# Patient Record
Sex: MALE | Race: BLACK OR AFRICAN AMERICAN | NOT HISPANIC OR LATINO
[De-identification: names, ages, dates, MRNs, and addresses within clinical notes are randomized per-mention and may not be internally consistent; named-entity substitution may affect disease eponyms.]

---

## 2022-07-17 ENCOUNTER — APPOINTMENT (OUTPATIENT)
Dept: ORTHOPEDIC SURGERY | Facility: CLINIC | Age: 62
End: 2022-07-17

## 2022-07-17 VITALS — WEIGHT: 246 LBS | HEIGHT: 69 IN | BODY MASS INDEX: 36.43 KG/M2

## 2022-07-17 DIAGNOSIS — S46.011A STRAIN OF MUSCLE(S) AND TENDON(S) OF THE ROTATOR CUFF OF RIGHT SHOULDER, INITIAL ENCOUNTER: ICD-10-CM

## 2022-07-17 PROBLEM — Z00.00 ENCOUNTER FOR PREVENTIVE HEALTH EXAMINATION: Status: ACTIVE | Noted: 2022-07-17

## 2022-07-17 PROCEDURE — 73030 X-RAY EXAM OF SHOULDER: CPT | Mod: RT

## 2022-07-17 PROCEDURE — J3490M: CUSTOM | Mod: RT

## 2022-07-17 PROCEDURE — 20611 DRAIN/INJ JOINT/BURSA W/US: CPT | Mod: RT

## 2022-07-17 PROCEDURE — 99214 OFFICE O/P EST MOD 30 MIN: CPT | Mod: 25

## 2022-07-17 NOTE — ASSESSMENT
[FreeTextEntry1] : right shoulder pain with lifting for several weeks.  mild oa.  possible cuff tear.\par \par diabetes. well controlled per patient\par nsaids prn

## 2022-07-17 NOTE — PROCEDURE
[Large Joint Injection] : Large joint injection [Right] : of the right [Subacromial Space] : subacromial space [Pain] : pain [Alcohol] : alcohol [Betadine] : betadine [___ cc    3mg] :  Betamethasone (Celestone) ~Vcc of 3mg [___ cc    1%] : Lidocaine ~Vcc of 1%  [___ cc    0.25%] : Bupivacaine (Marcaine) ~Vcc of 0.25%  [] : Patient tolerated procedure well [Call if redness, pain or fever occur] : call if redness, pain or fever occur [Apply ice for 15min out of every hour for the next 12-24 hours as tolerated] : apply ice for 15 minutes out of every hour for the next 12-24 hours as tolerated [Patient was advised to rest the joint(s) for ____ days] : patient was advised to rest the joint(s) for [unfilled] days [Previous OTC use and PT nontherapeutic] : patient has tried OTC's including aspirin, Ibuprofen, Aleve, etc or prescription NSAIDS, and/or exercises at home and/or physical therapy without satisfactory response [Patient had decreased mobility in the joint] : patient had decreased mobility in the joint [Risks, benefits, alternatives discussed / Verbal consent obtained] : the risks benefits, and alternatives have been discussed, and verbal consent was obtained [Prior failure or difficult injection] : prior failure or difficult injection [All ultrasound images have been permanently captured and stored accordingly in our picture archiving and communication system] : All ultrasound images have been permanently captured and stored accordingly in our picture archiving and communication system [Visualization of the needle and placement of injection was performed without complication] : visualization of the needle and placement of injection was performed without complication [Inflammation] : inflammation

## 2022-07-17 NOTE — HISTORY OF PRESENT ILLNESS
[8] : 8 [Sharp] : sharp [Bending forward] : bending forward [de-identified] : 7/17/22:  right shoulder pain for a few weeks. [FreeTextEntry5] : pt c.o pain rt shoulder for 2 weeks, \par pt states NKI

## 2022-07-17 NOTE — PHYSICAL EXAM
[] : tenderness at lateral shoulder [Right] : right shoulder [Degenerative change] : Degenerative change [TWNoteComboBox7] : active forward flexion 160 degrees [TWNoteComboBox6] : internal rotation L4 [de-identified] : external rotation 35 degrees

## 2022-08-05 ENCOUNTER — FORM ENCOUNTER (OUTPATIENT)
Age: 62
End: 2022-08-05

## 2022-08-06 ENCOUNTER — APPOINTMENT (OUTPATIENT)
Dept: MRI IMAGING | Facility: CLINIC | Age: 62
End: 2022-08-06

## 2022-08-06 PROCEDURE — 73221 MRI JOINT UPR EXTREM W/O DYE: CPT | Mod: RT

## 2022-08-16 ENCOUNTER — APPOINTMENT (OUTPATIENT)
Dept: ORTHOPEDIC SURGERY | Facility: CLINIC | Age: 62
End: 2022-08-16

## 2022-08-16 VITALS — BODY MASS INDEX: 36.43 KG/M2 | WEIGHT: 246 LBS | HEIGHT: 69 IN

## 2022-08-16 PROCEDURE — 99214 OFFICE O/P EST MOD 30 MIN: CPT

## 2022-08-16 NOTE — ASSESSMENT
[FreeTextEntry1] : right shoulder pain with lifting for several weeks.  mild oa.  mri 2022 with full thickness, retracted cuff tear.  csi on 7/17/22. \par \par diabetes. well controlled per patient\par nsaids prn

## 2022-08-16 NOTE — HISTORY OF PRESENT ILLNESS
[6] : 6 [5] : 5 [Dull/Aching] : dull/aching [Localized] : localized [Throbbing] : throbbing [de-identified] : 7/17/22:  right shoulder pain for a few weeks.\par 8/16/22:  csi last visit.  right shoulder pain.   [FreeTextEntry1] : right shoulder  [de-identified] : physical therapy

## 2022-08-16 NOTE — DATA REVIEWED
[MRI] : MRI [Right] : of the right [Shoulder] : shoulder [Report was reviewed and noted in the chart] : The report was reviewed and noted in the chart [I reviewed the films/CD] : I reviewed the films/CD

## 2022-08-16 NOTE — PHYSICAL EXAM
[] : tenderness at lateral shoulder [Right] : right shoulder [Degenerative change] : Degenerative change [TWNoteComboBox7] : active forward flexion 160 degrees [TWNoteComboBox6] : internal rotation L4 [de-identified] : external rotation 35 degrees

## 2022-08-16 NOTE — DISCUSSION/SUMMARY
[de-identified] : Instructions:  Progress Note completed by Kathrin Vu PA-C\par * Dr. Arvizu -- The documentation recorded accurately reflects the decisions made by me during this visit.

## 2022-09-27 ENCOUNTER — APPOINTMENT (OUTPATIENT)
Dept: ORTHOPEDIC SURGERY | Facility: CLINIC | Age: 62
End: 2022-09-27

## 2022-09-27 VITALS — BODY MASS INDEX: 36.43 KG/M2 | WEIGHT: 246 LBS | HEIGHT: 69 IN

## 2022-09-27 PROCEDURE — 99213 OFFICE O/P EST LOW 20 MIN: CPT

## 2022-09-27 NOTE — HISTORY OF PRESENT ILLNESS
[5] : 5 [1] : 2 [Dull/Aching] : dull/aching [de-identified] : 7/17/22:  right shoulder pain for a few weeks.\par 8/16/22:  csi last visit.  right shoulder pain.  \par 9/27/22:  right shoulder improved but still a decent amount of pain. [] : Post Surgical Visit: no [FreeTextEntry1] : right shoulder [de-identified] : physical therapy

## 2022-09-27 NOTE — PHYSICAL EXAM
[Right] : right shoulder [Degenerative change] : Degenerative change [] : tenderness at anterior shoulder [4 ___] : forward flexion 4[unfilled]/5 [4___] : abduction 4[unfilled]/5 [5___] : internal rotation 5[unfilled]/5 [TWNoteComboBox7] : active forward flexion 170 degrees [TWNoteComboBox6] : internal rotation L4 [de-identified] : external rotation 35 degrees

## 2022-09-27 NOTE — ASSESSMENT
[FreeTextEntry1] : right shoulder pain with lifting for several weeks.  mild oa.  mri 2022 with full thickness, retracted cuff tear.  csi on 7/17/22. improved with PT but still has some pain.\par \par diabetes. well controlled per patient\par nsaids prn

## 2022-09-27 NOTE — DISCUSSION/SUMMARY
[de-identified] : Instructions:  Progress Note completed by Kathrin Vu PA-C\par * Dr. Arvizu -- The documentation recorded accurately reflects the decisions made by me during this visit.

## 2022-11-01 ENCOUNTER — APPOINTMENT (OUTPATIENT)
Dept: ORTHOPEDIC SURGERY | Facility: CLINIC | Age: 62
End: 2022-11-01
Payer: COMMERCIAL

## 2022-11-01 VITALS — HEIGHT: 69 IN | BODY MASS INDEX: 36.43 KG/M2 | WEIGHT: 246 LBS

## 2022-11-01 PROCEDURE — 99213 OFFICE O/P EST LOW 20 MIN: CPT

## 2022-11-01 NOTE — DISCUSSION/SUMMARY
[de-identified] : Instructions:  Progress Note completed by Kathrin Vu PA-C\par * Dr. Arvizu -- The documentation recorded accurately reflects the decisions made by me during this visit.

## 2022-11-01 NOTE — HISTORY OF PRESENT ILLNESS
[2] : 2 [0] : 0 [Dull/Aching] : dull/aching [Localized] : localized [Rest] : rest [de-identified] : 7/17/22:  right shoulder pain for a few weeks.\par 8/16/22:  csi last visit.  right shoulder pain.  \par 9/27/22:  right shoulder improved but still a decent amount of pain.\par 11/1/22:  right shoulder improving with pt.  little discomfort.  [] : Post Surgical Visit: no [FreeTextEntry1] : right shoulder  [de-identified] : activity [de-identified] : a

## 2022-11-01 NOTE — ASSESSMENT
[FreeTextEntry1] : right shoulder pain with lifting for several weeks.  mild oa.  mri 2022 with full thickness, retracted cuff tear.  csi on 7/17/22.  improving with pt.  defers surgery at this time - understands risks. \par \par diabetes. well controlled per patient\par nsaids prn

## 2022-11-01 NOTE — PHYSICAL EXAM
[Right] : right shoulder [4 ___] : forward flexion 4[unfilled]/5 [4___] : abduction 4[unfilled]/5 [5___] : internal rotation 5[unfilled]/5 [] : no tenderness to palpation [TWNoteComboBox7] : active forward flexion 170 degrees [TWNoteComboBox6] : internal rotation L4 [de-identified] : active abduction 160 degrees [de-identified] : external rotation 70 degrees

## 2022-12-20 ENCOUNTER — APPOINTMENT (OUTPATIENT)
Dept: ORTHOPEDIC SURGERY | Facility: CLINIC | Age: 62
End: 2022-12-20

## 2023-08-13 ENCOUNTER — NON-APPOINTMENT (OUTPATIENT)
Age: 63
End: 2023-08-13

## 2024-02-07 ENCOUNTER — NON-APPOINTMENT (OUTPATIENT)
Age: 64
End: 2024-02-07

## 2024-02-16 ENCOUNTER — APPOINTMENT (OUTPATIENT)
Dept: ORTHOPEDIC SURGERY | Facility: CLINIC | Age: 64
End: 2024-02-16
Payer: COMMERCIAL

## 2024-02-16 PROCEDURE — 73030 X-RAY EXAM OF SHOULDER: CPT | Mod: RT

## 2024-02-16 PROCEDURE — 99213 OFFICE O/P EST LOW 20 MIN: CPT

## 2024-02-16 PROCEDURE — 73630 X-RAY EXAM OF FOOT: CPT | Mod: LT

## 2024-02-16 NOTE — DISCUSSION/SUMMARY
[de-identified] : mri right shoulder to assess. PT left foot. foot and ankle consult. follow up Nolberto after shoulder mri.

## 2024-02-16 NOTE — HISTORY OF PRESENT ILLNESS
[Dull/Aching] : dull/aching [Frequent] : frequent [Sleep] : sleep [Rest] : rest [de-identified] : 7/17/22:  right shoulder pain for a few weeks. 8/16/22:  csi last visit.  right shoulder pain.   9/27/22:  right shoulder improved but still a decent amount of pain. 11/1/22:  right shoulder improving with pt.  little discomfort.  2/16/24:  right shoulder pain flaring up.  also now left foot pain since december 2023.  no injury.  [] : no [FreeTextEntry1] : right shoulder  [FreeTextEntry5] : TATY is here for right shoulder pain. states he was treated last 11/2022. pt states pain prevents him from working ([employer]). limited ROM, pain worse at night.  he is also c/o L foot pain, worse with WB after resting.  [de-identified] : movement [de-identified] : 11/2022 [de-identified] : [facility]

## 2024-02-16 NOTE — PHYSICAL EXAM
[4 ___] : forward flexion 4[unfilled]/5 [5___] : eversion 5[unfilled]/5 [2+] : posterior tibialis pulse: 2+ [4___] : internal rotation 4[unfilled]/5 [Right] : right shoulder [TWNoteComboBox7] : active forward flexion 90 degrees [de-identified] : active abduction 90 degrees [TWNoteComboBox6] : internal rotation L4 [de-identified] : external rotation 40 degrees [] : no swelling [NL (40)] : plantar flexion 40 degrees [NL 30)] : inversion 30 degrees [NL (20)] : eversion 20 degrees [Left] : left foot [There are no fractures, subluxations or dislocations. No significant abnormalities are seen] : There are no fractures, subluxations or dislocations. No significant abnormalities are seen [FreeTextEntry8] : ttp plantar heel

## 2024-02-16 NOTE — ASSESSMENT
[FreeTextEntry1] : right shoulder pain with lifting for several weeks.  mild oa.  mri 2022 with full thickness, retracted cuff tear.  csi on 7/17/22.  recurrent pain.  patient was told by job ([employer]) that he needs to stop working and get shoulder fixed.  will get mri to assess condition of cuff.  left foot pain since december 2023.  no injury.    with plantar fasciitis    diabetes. well controlled per patient nsaids prn

## 2024-02-25 ENCOUNTER — APPOINTMENT (OUTPATIENT)
Dept: MRI IMAGING | Facility: CLINIC | Age: 64
End: 2024-02-25
Payer: COMMERCIAL

## 2024-02-25 PROCEDURE — 73221 MRI JOINT UPR EXTREM W/O DYE: CPT | Mod: RT

## 2024-03-05 ENCOUNTER — APPOINTMENT (OUTPATIENT)
Dept: ORTHOPEDIC SURGERY | Facility: CLINIC | Age: 64
End: 2024-03-05
Payer: COMMERCIAL

## 2024-03-05 DIAGNOSIS — M72.2 PLANTAR FASCIAL FIBROMATOSIS: ICD-10-CM

## 2024-03-05 PROCEDURE — 99214 OFFICE O/P EST MOD 30 MIN: CPT

## 2024-03-05 NOTE — ASSESSMENT
[FreeTextEntry1] : right shoulder pain with lifting for several weeks.  mild oa.  mri 2022 with full thickness, retracted cuff tear.  csi on 7/17/22.  recurrent pain.  patient was told by job ([employer]) that he needs to stop working and get shoulder fixed.  mri shows large tear but looks fixable.  defers surgery again.  left foot pain since december 2023.  no injury.    with plantar fasciitis  diabetes. well controlled per patient nsaids prn

## 2024-03-05 NOTE — PHYSICAL EXAM
[4 ___] : forward flexion 4[unfilled]/5 [4___] : internal rotation 4[unfilled]/5 [Right] : right shoulder [NL (40)] : plantar flexion 40 degrees [NL (20)] : eversion 20 degrees [NL 30)] : inversion 30 degrees [5___] : eversion 5[unfilled]/5 [2+] : posterior tibialis pulse: 2+ [Left] : left foot [There are no fractures, subluxations or dislocations. No significant abnormalities are seen] : There are no fractures, subluxations or dislocations. No significant abnormalities are seen [TWNoteComboBox7] : active forward flexion 90 degrees [de-identified] : active abduction 90 degrees [TWNoteComboBox6] : internal rotation L4 [de-identified] : external rotation 40 degrees [] : no abrasion [FreeTextEntry8] : ttp plantar heel

## 2024-03-05 NOTE — HISTORY OF PRESENT ILLNESS
[Gradual] : gradual [7] : 7 [6] : 6 [Dull/Aching] : dull/aching [Frequent] : frequent [Sleep] : sleep [Rest] : rest [de-identified] : 7/17/22:  right shoulder pain for a few weeks. 8/16/22:  csi last visit.  right shoulder pain.   9/27/22:  right shoulder improved but still a decent amount of pain. 11/1/22:  right shoulder improving with pt.  little discomfort.  2/16/24:  right shoulder pain flaring up.  also now left foot pain since december 2023.  no injury.  3/5/24:  right shoulder pain persists.   back to review mri right shoulder.   [] : no [de-identified] : movement [FreeTextEntry1] : right shoulder  [de-identified] : 11/2022 [de-identified] : [facility]

## 2024-03-27 ENCOUNTER — APPOINTMENT (OUTPATIENT)
Dept: ORTHOPEDIC SURGERY | Facility: CLINIC | Age: 64
End: 2024-03-27

## 2024-04-10 ENCOUNTER — APPOINTMENT (OUTPATIENT)
Dept: ORTHOPEDIC SURGERY | Facility: CLINIC | Age: 64
End: 2024-04-10

## 2024-04-23 ENCOUNTER — APPOINTMENT (OUTPATIENT)
Dept: ORTHOPEDIC SURGERY | Facility: CLINIC | Age: 64
End: 2024-04-23
Payer: COMMERCIAL

## 2024-04-23 ENCOUNTER — NON-APPOINTMENT (OUTPATIENT)
Age: 64
End: 2024-04-23

## 2024-04-23 VITALS — HEIGHT: 69 IN | BODY MASS INDEX: 36.43 KG/M2 | WEIGHT: 246 LBS

## 2024-04-23 DIAGNOSIS — S46.911D STRAIN OF UNSPECIFIED MUSCLE, FASCIA AND TENDON AT SHOULDER AND UPPER ARM LEVEL, RIGHT ARM, SUBSEQUENT ENCOUNTER: ICD-10-CM

## 2024-04-23 DIAGNOSIS — M19.011 PRIMARY OSTEOARTHRITIS, RIGHT SHOULDER: ICD-10-CM

## 2024-04-23 PROCEDURE — 99213 OFFICE O/P EST LOW 20 MIN: CPT

## 2024-04-23 NOTE — PHYSICAL EXAM
[4 ___] : forward flexion 4[unfilled]/5 [4___] : internal rotation 4[unfilled]/5 [Right] : right shoulder [Left] : left foot [There are no fractures, subluxations or dislocations. No significant abnormalities are seen] : There are no fractures, subluxations or dislocations. No significant abnormalities are seen [] : positive impingement testing [TWNoteComboBox7] : active forward flexion 100 degrees [de-identified] : active abduction 90 degrees [TWNoteComboBox6] : internal rotation L4 [de-identified] : external rotation 40 degrees [FreeTextEntry8] : ttp plantar heel

## 2024-04-23 NOTE — HISTORY OF PRESENT ILLNESS
[5] : 5 [4] : 4 [Dull/Aching] : dull/aching [Sharp] : sharp [de-identified] : 7/17/22:  right shoulder pain for a few weeks. 8/16/22:  csi last visit.  right shoulder pain.   9/27/22:  right shoulder improved but still a decent amount of pain. 11/1/22:  right shoulder improving with pt.  little discomfort.  2/16/24:  right shoulder pain flaring up.  also now left foot pain since december 2023.  no injury.  3/5/24:  right shoulder pain persists.   back to review mri right shoulder.  4/23/24: PT helping shoulder but still has pain.  [] : Post Surgical Visit: no [FreeTextEntry1] : right shoulder  [de-identified] : pt

## 2024-04-23 NOTE — ASSESSMENT
[FreeTextEntry1] : right shoulder pain.  mri 2022 with full thickness, retracted cuff tear.  csi on 7/17/22.    mri shows large tear but looks fixable.  defers surgery.  left foot pain since december 2023.  no injury.    with plantar fasciitis.  seeing podiatrist.  patient works at [employer].  cannot work now as he cannot do repetitive lifting or overhead lifting.   diabetes. well controlled per patient nsaids prn

## 2024-06-04 ENCOUNTER — APPOINTMENT (OUTPATIENT)
Dept: ORTHOPEDIC SURGERY | Facility: CLINIC | Age: 64
End: 2024-06-04
Payer: COMMERCIAL

## 2024-06-04 ENCOUNTER — NON-APPOINTMENT (OUTPATIENT)
Age: 64
End: 2024-06-04

## 2024-06-04 DIAGNOSIS — M75.121 COMPLETE ROTATOR CUFF TEAR OR RUPTURE OF RIGHT SHOULDER, NOT SPECIFIED AS TRAUMATIC: ICD-10-CM

## 2024-06-04 PROCEDURE — 99213 OFFICE O/P EST LOW 20 MIN: CPT

## 2024-06-04 NOTE — PHYSICAL EXAM
[Right] : right shoulder [4 ___] : forward flexion 4[unfilled]/5 [4___] : internal rotation 4[unfilled]/5 [] : tenderness at anterior shoulder [TWNoteComboBox7] : active forward flexion 110 degrees [de-identified] : active abduction 90 degrees [TWNoteComboBox6] : internal rotation L4 [de-identified] : external rotation 40 degrees [FreeTextEntry8] : ttp plantar heel

## 2024-06-04 NOTE — ASSESSMENT
[FreeTextEntry1] : right shoulder pain.  mri 2022 with full thickness, retracted cuff tear.  csi on 7/17/22.    mri shows large tear. defers surgery.  risks discussed.  left foot pain since december 2023.  no injury.    with plantar fasciitis.  seeing podiatrist.  patient works at [employer].  cannot work now as he cannot do repetitive lifting or overhead lifting.   diabetes. well controlled per patient nsaids prn

## 2024-06-04 NOTE — HISTORY OF PRESENT ILLNESS
[5] : 5 [4] : 4 [Dull/Aching] : dull/aching [Sharp] : sharp [de-identified] : 7/17/22:  right shoulder pain for a few weeks. 8/16/22:  csi last visit.  right shoulder pain.   9/27/22:  right shoulder improved but still a decent amount of pain. 11/1/22:  right shoulder improving with pt.  little discomfort.  2/16/24:  right shoulder pain flaring up.  also now left foot pain since december 2023.  no injury.  3/5/24:  right shoulder pain persists.   back to review mri right shoulder.  4/23/24: PT helping shoulder but still has pain.  6/4/24: shoulder still hurts.  no  PT since last visit. [] : Post Surgical Visit: no [FreeTextEntry1] : right shoulder  [de-identified] : pt

## 2024-07-16 ENCOUNTER — APPOINTMENT (OUTPATIENT)
Dept: ORTHOPEDIC SURGERY | Facility: CLINIC | Age: 64
End: 2024-07-16
Payer: COMMERCIAL

## 2024-07-16 DIAGNOSIS — M75.121 COMPLETE ROTATOR CUFF TEAR OR RUPTURE OF RIGHT SHOULDER, NOT SPECIFIED AS TRAUMATIC: ICD-10-CM

## 2024-07-16 PROCEDURE — 99213 OFFICE O/P EST LOW 20 MIN: CPT

## 2024-09-03 ENCOUNTER — NON-APPOINTMENT (OUTPATIENT)
Age: 64
End: 2024-09-03

## 2024-09-03 ENCOUNTER — APPOINTMENT (OUTPATIENT)
Dept: ORTHOPEDIC SURGERY | Facility: CLINIC | Age: 64
End: 2024-09-03
Payer: COMMERCIAL

## 2024-09-03 DIAGNOSIS — M19.011 PRIMARY OSTEOARTHRITIS, RIGHT SHOULDER: ICD-10-CM

## 2024-09-03 DIAGNOSIS — M75.121 COMPLETE ROTATOR CUFF TEAR OR RUPTURE OF RIGHT SHOULDER, NOT SPECIFIED AS TRAUMATIC: ICD-10-CM

## 2024-09-03 DIAGNOSIS — S46.911D STRAIN OF UNSPECIFIED MUSCLE, FASCIA AND TENDON AT SHOULDER AND UPPER ARM LEVEL, RIGHT ARM, SUBSEQUENT ENCOUNTER: ICD-10-CM

## 2024-09-03 PROCEDURE — 99214 OFFICE O/P EST MOD 30 MIN: CPT

## 2024-09-03 NOTE — HISTORY OF PRESENT ILLNESS
[5] : 5 [4] : 4 [Dull/Aching] : dull/aching [Sharp] : sharp [de-identified] : 7/17/22:  right shoulder pain for a few weeks. 8/16/22:  csi last visit.  right shoulder pain.   9/27/22:  right shoulder improved but still a decent amount of pain. 11/1/22:  right shoulder improving with pt.  little discomfort.  2/16/24:  right shoulder pain flaring up.  also now left foot pain since december 2023.  no injury.  3/5/24:  right shoulder pain persists.   back to review mri right shoulder.  4/23/24: PT helping shoulder but still has pain.  6/4/24: shoulder still hurts.  no  PT since last visit. 7/16/24:  follow up right shoulder.  mild pain still.   9/3/24:  follow up right shoulder.  improves with pt.  [] : Post Surgical Visit: no [FreeTextEntry1] : right shoulder  [de-identified] : pt

## 2024-09-03 NOTE — DISCUSSION/SUMMARY
[de-identified] : continue pt. still defers surgery at this time.  he understands risks. prefers otc meds prn.  The patient's orthopaedic condition(s) warrants consideration of consistent or intermittent use of a prescription strength non-steroidal anti-inflammatory medication.  These medications are associated with risks including but not limited to gastrointestinal irritation, kidney damage, hypertension, and bleeding.    Although clinically indicated, the patient defers taking such medications at this time. hep. follow up in 4-6 weeks.   Progress note completed by Kathrin Vu PA-C *Dr. Arvizu - The [facility] assigned on this date is under my supervision and saw this patient independently on this visit. I was in the office suite at the time.  I have periodically reviewed the patient chart as needed and I continue to oversee the medical decision making and care.

## 2024-09-03 NOTE — PHYSICAL EXAM
[Right] : right shoulder [4 ___] : forward flexion 4[unfilled]/5 [4___] : internal rotation 4[unfilled]/5 [] : no erythema [TWNoteComboBox7] : active forward flexion 140 degrees [de-identified] : active abduction 105 degrees [TWNoteComboBox6] : internal rotation L5 [de-identified] : external rotation 50 degrees [FreeTextEntry8] : ttp plantar heel

## 2024-09-03 NOTE — ASSESSMENT
[FreeTextEntry1] : right shoulder pain.  mri 2022 with full thickness, retracted cuff tear.  csi on 7/17/22.  mri shows large tear. defers surgery.  risks discussed.  left foot pain since december 2023.  no injury.    with plantar fasciitis.  seeing podiatrist.  patient works at [employer].  cannot work as he cannot do repetitive lifting or overhead lifting.  patient oow.   diabetes. well controlled per patient nsaids prn

## 2024-10-15 ENCOUNTER — APPOINTMENT (OUTPATIENT)
Dept: ORTHOPEDIC SURGERY | Facility: CLINIC | Age: 64
End: 2024-10-15

## 2024-10-15 ENCOUNTER — APPOINTMENT (OUTPATIENT)
Dept: ORTHOPEDIC SURGERY | Facility: CLINIC | Age: 64
End: 2024-10-15
Payer: COMMERCIAL

## 2024-10-15 VITALS — HEIGHT: 69 IN | BODY MASS INDEX: 36.43 KG/M2 | WEIGHT: 246 LBS

## 2024-10-15 DIAGNOSIS — M75.121 COMPLETE ROTATOR CUFF TEAR OR RUPTURE OF RIGHT SHOULDER, NOT SPECIFIED AS TRAUMATIC: ICD-10-CM

## 2024-10-15 PROCEDURE — 99213 OFFICE O/P EST LOW 20 MIN: CPT

## 2024-11-26 ENCOUNTER — APPOINTMENT (OUTPATIENT)
Dept: ORTHOPEDIC SURGERY | Facility: CLINIC | Age: 64
End: 2024-11-26